# Patient Record
Sex: MALE | Race: WHITE | NOT HISPANIC OR LATINO | Employment: OTHER | URBAN - METROPOLITAN AREA
[De-identification: names, ages, dates, MRNs, and addresses within clinical notes are randomized per-mention and may not be internally consistent; named-entity substitution may affect disease eponyms.]

---

## 2017-01-06 ENCOUNTER — OFFICE VISIT (OUTPATIENT)
Dept: FAMILY MEDICINE CLINIC | Facility: CLINIC | Age: 67
End: 2017-01-06

## 2017-01-06 VITALS
HEART RATE: 80 BPM | SYSTOLIC BLOOD PRESSURE: 122 MMHG | DIASTOLIC BLOOD PRESSURE: 76 MMHG | BODY MASS INDEX: 34.96 KG/M2 | OXYGEN SATURATION: 95 % | HEIGHT: 69 IN | WEIGHT: 236 LBS

## 2017-01-06 DIAGNOSIS — G89.29 CHRONIC BILATERAL LOW BACK PAIN WITHOUT SCIATICA: ICD-10-CM

## 2017-01-06 DIAGNOSIS — M54.50 CHRONIC BILATERAL LOW BACK PAIN WITHOUT SCIATICA: ICD-10-CM

## 2017-01-06 DIAGNOSIS — E78.2 MIXED HYPERLIPIDEMIA: Primary | ICD-10-CM

## 2017-01-06 PROCEDURE — 99213 OFFICE O/P EST LOW 20 MIN: CPT | Performed by: PHYSICIAN ASSISTANT

## 2017-01-06 RX ORDER — PRAVASTATIN SODIUM 40 MG
40 TABLET ORAL DAILY
Qty: 90 TABLET | Refills: 3 | Status: SHIPPED | OUTPATIENT
Start: 2017-01-06 | End: 2017-03-31 | Stop reason: SDUPTHER

## 2017-01-06 NOTE — PROGRESS NOTES
Subjective   Flavio Slater is a 66 y.o. male    History of Present Illness  Patient presents a for follow-up on hyperlipidemia and chronic low back pain associated to degenerative disc disease and spinal stenosis.  He states that his low back pain is constant and daily ranging from 3-7 depending on activity.  It improves with activity and worsens with prolonged rest.  Pain medication helps significantly.  He is able to function.  Daily activities with the assistance of his Norco which he takes typically 3 times a day.  He states some days he will needed 4 times a day and then other days twice a day, a quantity of 90 last him 30 days.  He states his back pain is stable, no recent changes.  Patient has been off of pravastatin for the past 2 months after he ran out of medication.  He states when he was initially diagnosed with diabetes he develops myalgias from it but then the myalgias went away.  The last time he filled his pravastatin he was tolerating it without any adverse effects.  His lipids were last checked in March.  He is not fasting today.  The following portions of the patient's history were reviewed and updated as appropriate: allergies, current medications, past social history and problem list    Review of Systems   Constitutional: Negative.  Negative for fatigue and unexpected weight change.   Respiratory: Negative.  Negative for cough, chest tightness and shortness of breath.    Cardiovascular: Negative for chest pain, palpitations and leg swelling.   Gastrointestinal: Negative.  Negative for nausea.   Musculoskeletal: Positive for back pain. Negative for arthralgias, gait problem and myalgias.   Skin: Negative for color change and rash.   Neurological: Negative for dizziness, tremors, syncope, weakness, numbness and headaches.   Psychiatric/Behavioral: Negative for behavioral problems and dysphoric mood. The patient is not nervous/anxious.        Objective     Vitals:    01/06/17 0918   BP: 122/76    Pulse: 80   SpO2: 95%       Physical Exam   Constitutional: He is oriented to person, place, and time. He appears well-developed and well-nourished.   Neck: No JVD present.   Cardiovascular: Normal rate, regular rhythm, normal heart sounds, intact distal pulses and normal pulses.    No murmur heard.  Pulmonary/Chest: Effort normal and breath sounds normal. No respiratory distress.   Abdominal: Soft. Bowel sounds are normal. There is no hepatosplenomegaly. There is no tenderness.   Musculoskeletal: He exhibits no edema.        Lumbar back: He exhibits decreased range of motion, tenderness, bony tenderness and pain. He exhibits no swelling, no deformity and no spasm.   Neurological: He is alert and oriented to person, place, and time. He has normal reflexes.   Skin: Skin is warm and dry.   Nursing note and vitals reviewed.      Assessment/Plan     Diagnoses and all orders for this visit:    Mixed hyperlipidemia  -     pravastatin (PRAVACHOL) 40 MG tablet; Take 1 tablet by mouth Daily.    Chronic bilateral low back pain without sciatica     recommended patient having his lipids checked within the next 6 months when he is fasting.  Refilled Norco 10/325 one 3 times a day as needed for back pain dispensed #90 with no refills, follow-up in one month for recheck.

## 2017-01-06 NOTE — MR AVS SNAPSHOT
Flavio Slater   1/6/2017 8:45 AM   Office Visit    Dept Phone:  [phone]   Encounter #:  [id number]    Provider:  Destinee Correia PA-C   Department:  [facility]                Your Full Care Plan              Where to Get Your Medications      These medications were sent to [facility] - [place], [place] - [address] [place] [address] - [phone]  - [phone]   [address] [place] [address]     Phone:  [phone]     pravastatin 40 MG tablet            Your Updated Medication List          This list is accurate as of: 1/6/17  9:41 AM.  Always use your most recent med list.                aspirin 81 MG EC tablet       cephalexin 500 MG capsule   Commonly known as:  KEFLEX   Take 1 capsule by mouth 3 (Three) Times a Day.       diltiaZEM  MG 24 hr capsule   Commonly known as:  CARDIZEM CD       diltiazem  MG 24 hr capsule   Commonly known as:  DILACOR XR       freestyle lancets   Test blood glucose twice daily.       * FREESTYLE LITE TEST VI       * FREESTYLE LITE test strip   Generic drug:  glucose blood   USE TWICE A DAY       gabapentin 300 MG capsule   Commonly known as:  NEURONTIN       HYDROcodone-acetaminophen  MG per tablet   Commonly known as:  NORCO   Take 1 tablet by mouth 3 (Three) Times a Day As Needed for moderate pain (4-6).       lisinopril-hydrochlorothiazide 20-25 MG per tablet   Commonly known as:  PRINZIDE,ZESTORETIC       LORazepam 1 MG tablet   Commonly known as:  ATIVAN   Take 1 tablet by mouth 2 (Two) Times a Day.       metFORMIN  MG 24 hr tablet   Commonly known as:  GLUCOPHAGE-XR   2 tabs po BID       PARoxetine 40 MG tablet   Commonly known as:  PAXIL   Take 1 tablet by mouth every morning.       pravastatin 40 MG tablet   Commonly known as:  PRAVACHOL   Take 1 tablet by mouth Daily.       spironolactone 25 MG tablet   Commonly known as:  ALDACTONE       * Notice:  This  "list has 2 medication(s) that are the same as other medications prescribed for you. Read the directions carefully, and ask your doctor or other care provider to review them with you.            You Were Diagnosed With        Codes Comments    Mixed hyperlipidemia    -  Primary ICD-10-CM: E78.2  ICD-9-CM: 272.2       Instructions     None    Patient Instructions History      Upcoming Appointments     Visit Type Date Time Department    OFFICE VISIT 2017  8:45 AM [facility] Signup     [facility] allows you to send messages to your doctor, view your test results, renew your prescriptions, schedule appointments, and more. To sign up, go to Meez and click on the Sign Up Now link in the New User? box. Enter your [employer] Activation Code exactly as it appears below along with the last four digits of your Social Security Number and your Date of Birth () to complete the sign-up process. If you do not sign up before the expiration date, you must request a new code.    [employer] Activation Code: [email or web address]  Expires: 2017  5:37 AM    If you have questions, you can email [email or web address] or call [phone] to talk to our [employer] staff. Remember, [employer] is NOT to be used for urgent needs. For medical emergencies, dial 911.               Other Info from Your Visit           Allergies     Phenobarbital Sodium      Hypnotics/sedatives/sleep disorder agents      Reason for Visit     Back Pain     Hyperlipidemia           Vital Signs     Blood Pressure Pulse Height Weight Oxygen Saturation Body Mass Index    122/76 (BP Location: Right arm, Patient Position: Sitting) 80 69\" (175.3 cm) 236 lb (107 kg) 95% 34.85 kg/m2    Smoking Status                   Former Smoker           Problems and Diagnoses Noted     High cholesterol or triglycerides        "

## 2017-01-27 ENCOUNTER — OFFICE VISIT (OUTPATIENT)
Dept: FAMILY MEDICINE CLINIC | Facility: CLINIC | Age: 67
End: 2017-01-27

## 2017-01-27 VITALS
RESPIRATION RATE: 20 BRPM | TEMPERATURE: 98.4 F | SYSTOLIC BLOOD PRESSURE: 120 MMHG | HEART RATE: 100 BPM | HEIGHT: 69 IN | WEIGHT: 232 LBS | OXYGEN SATURATION: 98 % | DIASTOLIC BLOOD PRESSURE: 74 MMHG | BODY MASS INDEX: 34.36 KG/M2

## 2017-01-27 DIAGNOSIS — J02.9 SORE THROAT: ICD-10-CM

## 2017-01-27 DIAGNOSIS — J06.9 URI, ACUTE: ICD-10-CM

## 2017-01-27 DIAGNOSIS — R50.81 FEVER IN OTHER DISEASES: Primary | ICD-10-CM

## 2017-01-27 LAB
EXPIRATION DATE: NORMAL
EXPIRATION DATE: NORMAL
FLUAV AG NPH QL: NEGATIVE
FLUBV AG NPH QL: NEGATIVE
INTERNAL CONTROL: NORMAL
INTERNAL CONTROL: NORMAL
Lab: NORMAL
Lab: NORMAL
S PYO AG THROAT QL: NEGATIVE

## 2017-01-27 PROCEDURE — 87880 STREP A ASSAY W/OPTIC: CPT | Performed by: PHYSICIAN ASSISTANT

## 2017-01-27 PROCEDURE — 87804 INFLUENZA ASSAY W/OPTIC: CPT | Performed by: PHYSICIAN ASSISTANT

## 2017-01-27 PROCEDURE — 99213 OFFICE O/P EST LOW 20 MIN: CPT | Performed by: PHYSICIAN ASSISTANT

## 2017-01-27 RX ORDER — AMOXICILLIN 875 MG/1
875 TABLET, COATED ORAL 2 TIMES DAILY
Qty: 14 TABLET | Refills: 0 | Status: SHIPPED | OUTPATIENT
Start: 2017-01-27 | End: 2017-01-27 | Stop reason: SDUPTHER

## 2017-01-27 RX ORDER — DEXTROMETHORPHAN HYDROBROMIDE AND PROMETHAZINE HYDROCHLORIDE 15; 6.25 MG/5ML; MG/5ML
SYRUP ORAL
Qty: 240 ML | Refills: 0 | Status: SHIPPED | OUTPATIENT
Start: 2017-01-27 | End: 2017-01-27 | Stop reason: SDUPTHER

## 2017-01-27 RX ORDER — AMOXICILLIN 875 MG/1
875 TABLET, COATED ORAL 2 TIMES DAILY
Qty: 14 TABLET | Refills: 0 | Status: SHIPPED | OUTPATIENT
Start: 2017-01-27 | End: 2017-02-06

## 2017-01-27 RX ORDER — DEXTROMETHORPHAN HYDROBROMIDE AND PROMETHAZINE HYDROCHLORIDE 15; 6.25 MG/5ML; MG/5ML
SYRUP ORAL
Qty: 240 ML | Refills: 0 | Status: SHIPPED | OUTPATIENT
Start: 2017-01-27 | End: 2017-02-06

## 2017-01-27 NOTE — MR AVS SNAPSHOT
Flavio Slater   1/27/2017 9:30 AM   Office Visit    Dept Phone:  [phone]   Encounter #:  [id number]    Provider:  Destinee Correia PA-C   Department:  [facility]                Your Full Care Plan              Today's Medication Changes          These changes are accurate as of: 1/27/17 10:13 AM.  If you have any questions, ask your nurse or doctor.               New Medication(s)Ordered:     amoxicillin 875 MG tablet   Commonly known as:  AMOXIL   Take 1 tablet by mouth 2 (Two) Times a Day.   Started by:  Destinee Correia PA-C       promethazine-dextromethorphan 6.25-15 MG/5ML syrup   Commonly known as:  PROMETHAZINE-DM   Take 1-2 tsp every 4 hours as needed   Started by:  Destinee Correia PA-C         Stop taking medication(s)listed here:     cephalexin 500 MG capsule   Commonly known as:  KEFLEX   Stopped by:  Dsetinee Correia PA-C           diltiaZEM  MG 24 hr capsule   Commonly known as:  CARDIZEM CD   Stopped by:  Destinee Correia PA-C                Where to Get Your Medications      These medications were sent to [place] [phone] [phone] [address] [address]     Phone:  [phone]     amoxicillin 875 MG tablet    promethazine-dextromethorphan 6.25-15 MG/5ML syrup                  Your Updated Medication List          This list is accurate as of: 1/27/17 10:13 AM.  Always use your most recent med list.                amoxicillin 875 MG tablet   Commonly known as:  AMOXIL   Take 1 tablet by mouth 2 (Two) Times a Day.       aspirin 81 MG EC tablet       diltiazem  MG 24 hr capsule   Commonly known as:  DILACOR XR       freestyle lancets   Test blood glucose twice daily.       * FREESTYLE LITE TEST VI       * FREESTYLE LITE test strip   Generic drug:  glucose blood   USE TWICE A DAY       gabapentin 300 MG capsule   Commonly known as:  NEURONTIN        HYDROcodone-acetaminophen  MG per tablet   Commonly known as:  NORCO   Take 1 tablet by mouth 3 (Three) Times a Day As Needed for moderate pain (4-6).       lisinopril-hydrochlorothiazide 20-25 MG per tablet   Commonly known as:  PRINZIDE,ZESTORETIC       LORazepam 1 MG tablet   Commonly known as:  ATIVAN   Take 1 tablet by mouth 2 (Two) Times a Day.       metFORMIN  MG 24 hr tablet   Commonly known as:  GLUCOPHAGE-XR   2 tabs po BID       PARoxetine 40 MG tablet   Commonly known as:  PAXIL   Take 1 tablet by mouth every morning.       pravastatin 40 MG tablet   Commonly known as:  PRAVACHOL   Take 1 tablet by mouth Daily.       promethazine-dextromethorphan 6.25-15 MG/5ML syrup   Commonly known as:  PROMETHAZINE-DM   Take 1-2 tsp every 4 hours as needed       spironolactone 25 MG tablet   Commonly known as:  ALDACTONE       * Notice:  This list has 2 medication(s) that are the same as other medications prescribed for you. Read the directions carefully, and ask your doctor or other care provider to review them with you.            We Performed the Following     POC Influenza A / B     POC Rapid Strep A       You Were Diagnosed With        Codes Comments    Fever in other diseases    -  Primary ICD-10-CM: R50.81     Sore throat     ICD-10-CM: J02.9  ICD-9-CM: 462     URI, acute     ICD-10-CM: J06.9  ICD-9-CM: 465.9       Instructions     None    Patient Instructions History      Upcoming Appointments     Visit Type Date Time Department    OFFICE VISIT 1/27/2017  9:30 AM [facility]    FOLLOW UP 2/6/2017 11:45 AM [facility]      [employer] Signup     Our records indicate that you have an active [employer] account.    You can view your After Visit Summary by going to [employer] and logging in with your [employer] username and password.  If you don't have a [employer] username and password but a parent or guardian has access to your record, the parent or guardian should  "login with their own [employer] username and password and access your record to view the After Visit Summary.    If you have questions, you can email [facility], [email or web address] or call [phone] to talk to our [employer] staff.  Remember, [employer] is NOT to be used for urgent needs.  For medical emergencies, dial 911.               Other Info from Your Visit           Your Appointments     Feb 06, 2017 11:45 AM EST   Follow Up with Destinee Correia PA-C   [facility]INE (--)    [address] [place] [address] [address] [place] [address] [id number] [phone]           Arrive 15 minutes prior to appointment.              Allergies     Phenobarbital Sodium      Hypnotics/sedatives/sleep disorder agents      Reason for Visit     Fever States symptoms started two days     Nasal Congestion     Cough           Vital Signs     Blood Pressure Pulse Temperature Respirations Height Weight    120/74 (BP Location: Left arm, Patient Position: Sitting) 100 98.4 °F (36.9 °C) (Oral) 20 69\" (175.3 cm) 232 lb (105 kg)    Oxygen Saturation Body Mass Index Smoking Status             98% 34.26 kg/m2 Former Smoker         Problems and Diagnoses Noted     Fever    -  Primary    Sore throat        URI, acute          Results     POC Rapid Strep A      Component Value Standard Range & Units    Rapid Strep A Screen Negative Negative, VALID, INVALID, Not Performed    Internal Control Passed Passed    Lot Number [id number]     Expiration Date 2018/4                     "

## 2017-01-27 NOTE — PROGRESS NOTES
Subjective   Flavio Slater is a 66 y.o. male    History of Present Illness  Patient resents today complaining of symptoms that started 2-3 days ago with a cough which is productive at times, sore throat, postnasal drainage, nasal congestion.  Low-grade fever at times.  The following portions of the patient's history were reviewed and updated as appropriate: allergies, current medications, past social history and problem list    Review of Systems   Constitutional: Positive for chills. Negative for fever.   HENT: Positive for congestion, postnasal drip, rhinorrhea, sneezing, sore throat and voice change. Negative for sinus pressure.    Respiratory: Positive for cough.    Neurological: Positive for headaches.       Objective     Vitals:    01/27/17 0946   BP: 120/74   Pulse: 100   Resp: 20   Temp: 98.4 °F (36.9 °C)   SpO2: 98%       Physical Exam   Constitutional: He appears well-developed and well-nourished. No distress.   HENT:   Head: Normocephalic and atraumatic.   Right Ear: External ear normal.   Left Ear: External ear normal.   Nose: Nose normal.   Mouth/Throat: Oropharynx is clear and moist. No oropharyngeal exudate.   Eyes: Conjunctivae are normal. Right eye exhibits no discharge. Left eye exhibits no discharge.   Neck: Normal range of motion. Neck supple.   Cardiovascular: Normal rate, regular rhythm and normal heart sounds.    Pulmonary/Chest: Effort normal and breath sounds normal. No respiratory distress.   Lymphadenopathy:     He has no cervical adenopathy.   Skin: Skin is warm and dry. He is not diaphoretic.   Nursing note and vitals reviewed.      Assessment/Plan     Diagnoses and all orders for this visit:    Fever in other diseases  -     POC Rapid Strep A  -     POC Influenza A / B  -     Discontinue: amoxicillin (AMOXIL) 875 MG tablet; Take 1 tablet by mouth 2 (Two) Times a Day.  -     Discontinue: promethazine-dextromethorphan (PROMETHAZINE-DM) 6.25-15 MG/5ML syrup; Take 1-2 tsp every 4 hours as  needed  -     promethazine-dextromethorphan (PROMETHAZINE-DM) 6.25-15 MG/5ML syrup; Take 1-2 tsp every 4 hours as needed  -     amoxicillin (AMOXIL) 875 MG tablet; Take 1 tablet by mouth 2 (Two) Times a Day.    Sore throat  -     POC Rapid Strep A  -     POC Influenza A / B  -     Discontinue: amoxicillin (AMOXIL) 875 MG tablet; Take 1 tablet by mouth 2 (Two) Times a Day.  -     Discontinue: promethazine-dextromethorphan (PROMETHAZINE-DM) 6.25-15 MG/5ML syrup; Take 1-2 tsp every 4 hours as needed  -     promethazine-dextromethorphan (PROMETHAZINE-DM) 6.25-15 MG/5ML syrup; Take 1-2 tsp every 4 hours as needed  -     amoxicillin (AMOXIL) 875 MG tablet; Take 1 tablet by mouth 2 (Two) Times a Day.    URI, acute  -     Discontinue: amoxicillin (AMOXIL) 875 MG tablet; Take 1 tablet by mouth 2 (Two) Times a Day.  -     Discontinue: promethazine-dextromethorphan (PROMETHAZINE-DM) 6.25-15 MG/5ML syrup; Take 1-2 tsp every 4 hours as needed  -     promethazine-dextromethorphan (PROMETHAZINE-DM) 6.25-15 MG/5ML syrup; Take 1-2 tsp every 4 hours as needed  -     amoxicillin (AMOXIL) 875 MG tablet; Take 1 tablet by mouth 2 (Two) Times a Day.

## 2017-02-06 ENCOUNTER — OFFICE VISIT (OUTPATIENT)
Dept: FAMILY MEDICINE CLINIC | Facility: CLINIC | Age: 67
End: 2017-02-06

## 2017-02-06 VITALS
HEIGHT: 69 IN | BODY MASS INDEX: 34.36 KG/M2 | DIASTOLIC BLOOD PRESSURE: 68 MMHG | WEIGHT: 232 LBS | HEART RATE: 84 BPM | SYSTOLIC BLOOD PRESSURE: 134 MMHG | OXYGEN SATURATION: 97 %

## 2017-02-06 DIAGNOSIS — G89.29 CHRONIC BILATERAL LOW BACK PAIN WITHOUT SCIATICA: ICD-10-CM

## 2017-02-06 DIAGNOSIS — I10 ESSENTIAL HYPERTENSION: Primary | ICD-10-CM

## 2017-02-06 DIAGNOSIS — M54.50 CHRONIC BILATERAL LOW BACK PAIN WITHOUT SCIATICA: ICD-10-CM

## 2017-02-06 PROCEDURE — 99213 OFFICE O/P EST LOW 20 MIN: CPT | Performed by: PHYSICIAN ASSISTANT

## 2017-02-06 RX ORDER — LISINOPRIL 40 MG/1
40 TABLET ORAL DAILY
Qty: 30 TABLET | Refills: 1 | Status: SHIPPED | OUTPATIENT
Start: 2017-02-06 | End: 2017-03-06 | Stop reason: SDUPTHER

## 2017-02-06 NOTE — PROGRESS NOTES
Subjective   Flavio Slater is a 66 y.o. male    History of Present Illness  Patient presents today for follow-up on chronic low back pain associated with degenerative disc disease and spinal stenosis.  He is here for refills of his Norco 10/325 that he takes for this on a daily basis.  He is also here for evaluation of hypertension and wants to discuss a possible med change for his lisinopril. He is having  a dry mouth lately and he believes that this is related to his Hydrocort thiazide.  He would like to change his blood pressure medicine to remove this from it.  Blood pressure has been well controlled.  Renal function was normal when last checked within the past year.  Patient states his back pain today as a 5-6 out of 10.  He has not yet taken his pain medication.  He takes this between one to 3 times daily depending upon his pain.  He states he took it 3 times a day through the weekend.  He denies any adverse effects from Norco.  The following portions of the patient's history were reviewed and updated as appropriate: allergies, current medications, past social history and problem list    Review of Systems   Constitutional: Negative.  Negative for fatigue and unexpected weight change.   Respiratory: Negative.  Negative for cough, chest tightness and shortness of breath.    Cardiovascular: Negative for chest pain, palpitations and leg swelling.   Gastrointestinal: Negative.  Negative for nausea.   Musculoskeletal: Positive for back pain. Negative for arthralgias, gait problem and myalgias.   Skin: Negative for color change and rash.   Neurological: Negative for dizziness, tremors, syncope, weakness, numbness and headaches.   Psychiatric/Behavioral: Negative for behavioral problems and dysphoric mood. The patient is not nervous/anxious.        Objective     Vitals:    02/06/17 1147   BP: 134/68   Pulse: 84   SpO2: 97%       Physical Exam   Constitutional: He is oriented to person, place, and time. He appears  well-developed and well-nourished.   Neck: No JVD present.   Cardiovascular: Normal rate, regular rhythm, normal heart sounds, intact distal pulses and normal pulses.    No murmur heard.  Pulmonary/Chest: Effort normal and breath sounds normal. No respiratory distress.   Abdominal: Soft. Bowel sounds are normal. There is no hepatosplenomegaly. There is no tenderness.   Musculoskeletal: He exhibits no edema.        Lumbar back: He exhibits decreased range of motion, tenderness, bony tenderness and pain. He exhibits no swelling, no deformity and no spasm.   Neurological: He is alert and oriented to person, place, and time. He has normal reflexes.   Skin: Skin is warm and dry.   Nursing note and vitals reviewed.      Assessment/Plan     Diagnoses and all orders for this visit:    Essential hypertension  -     lisinopril (PRINIVIL,ZESTRIL) 40 MG tablet; Take 1 tablet by mouth Daily.    Chronic bilateral low back pain without sciatica     discontinue Zestoretic, increase lisinopril to 40 mg daily, recheck blood pressure in 1 month.  Refilled Norco 10/325 to take 1 3 times a day as needed #90 with no refills, follow-up in one month for recheck.  Attempted to get UDS specimen today the patient had just urinated and was unable to give us a specimen.  We will obtain this at his visit next month prior to administering his medication.

## 2017-02-08 ENCOUNTER — TELEPHONE (OUTPATIENT)
Dept: FAMILY MEDICINE CLINIC | Facility: CLINIC | Age: 67
End: 2017-02-08

## 2017-02-16 RX ORDER — DILTIAZEM HYDROCHLORIDE 240 MG/1
240 CAPSULE, EXTENDED RELEASE ORAL DAILY
Qty: 90 CAPSULE | Refills: 1 | Status: SHIPPED | OUTPATIENT
Start: 2017-02-16 | End: 2017-03-31 | Stop reason: SDUPTHER

## 2017-03-06 ENCOUNTER — OFFICE VISIT (OUTPATIENT)
Dept: FAMILY MEDICINE CLINIC | Facility: CLINIC | Age: 67
End: 2017-03-06

## 2017-03-06 ENCOUNTER — APPOINTMENT (OUTPATIENT)
Dept: LAB | Facility: HOSPITAL | Age: 67
End: 2017-03-06

## 2017-03-06 VITALS
DIASTOLIC BLOOD PRESSURE: 80 MMHG | TEMPERATURE: 98.2 F | HEART RATE: 88 BPM | HEIGHT: 69 IN | WEIGHT: 232 LBS | BODY MASS INDEX: 34.36 KG/M2 | SYSTOLIC BLOOD PRESSURE: 140 MMHG

## 2017-03-06 DIAGNOSIS — E11.9 TYPE 2 DIABETES MELLITUS WITHOUT COMPLICATION, UNSPECIFIED LONG TERM INSULIN USE STATUS: ICD-10-CM

## 2017-03-06 DIAGNOSIS — M54.50 CHRONIC BILATERAL LOW BACK PAIN WITHOUT SCIATICA: Primary | ICD-10-CM

## 2017-03-06 DIAGNOSIS — E78.2 MIXED HYPERLIPIDEMIA: ICD-10-CM

## 2017-03-06 DIAGNOSIS — I10 ESSENTIAL HYPERTENSION: ICD-10-CM

## 2017-03-06 DIAGNOSIS — G89.29 CHRONIC BILATERAL LOW BACK PAIN WITHOUT SCIATICA: Primary | ICD-10-CM

## 2017-03-06 LAB
ALBUMIN SERPL-MCNC: 4.3 G/DL (ref 3.2–4.8)
ALBUMIN/GLOB SERPL: 1.5 G/DL (ref 1.5–2.5)
ALP SERPL-CCNC: 63 U/L (ref 25–100)
ALT SERPL W P-5'-P-CCNC: 31 U/L (ref 7–40)
ANION GAP SERPL CALCULATED.3IONS-SCNC: 6 MMOL/L (ref 3–11)
ARTICHOKE IGE QN: 101 MG/DL (ref 0–130)
AST SERPL-CCNC: 26 U/L (ref 0–33)
BILIRUB SERPL-MCNC: 0.5 MG/DL (ref 0.3–1.2)
BUN BLD-MCNC: 14 MG/DL (ref 9–23)
BUN/CREAT SERPL: 20 (ref 7–25)
CALCIUM SPEC-SCNC: 9.2 MG/DL (ref 8.7–10.4)
CHLORIDE SERPL-SCNC: 103 MMOL/L (ref 99–109)
CHOLEST SERPL-MCNC: 212 MG/DL (ref 0–200)
CO2 SERPL-SCNC: 28 MMOL/L (ref 20–31)
CREAT BLD-MCNC: 0.7 MG/DL (ref 0.6–1.3)
GFR SERPL CREATININE-BSD FRML MDRD: 114 ML/MIN/1.73
GLOBULIN UR ELPH-MCNC: 2.9 GM/DL
GLUCOSE BLD-MCNC: 148 MG/DL (ref 70–100)
HBA1C MFR BLD: 7.8 % (ref 4.8–5.6)
HDLC SERPL-MCNC: 39 MG/DL (ref 40–60)
POTASSIUM BLD-SCNC: 4.3 MMOL/L (ref 3.5–5.5)
PROT SERPL-MCNC: 7.2 G/DL (ref 5.7–8.2)
SODIUM BLD-SCNC: 137 MMOL/L (ref 132–146)
TRIGL SERPL-MCNC: 439 MG/DL (ref 0–150)

## 2017-03-06 PROCEDURE — 83036 HEMOGLOBIN GLYCOSYLATED A1C: CPT | Performed by: PHYSICIAN ASSISTANT

## 2017-03-06 PROCEDURE — 80061 LIPID PANEL: CPT | Performed by: PHYSICIAN ASSISTANT

## 2017-03-06 PROCEDURE — 36415 COLL VENOUS BLD VENIPUNCTURE: CPT | Performed by: PHYSICIAN ASSISTANT

## 2017-03-06 PROCEDURE — 80053 COMPREHEN METABOLIC PANEL: CPT | Performed by: PHYSICIAN ASSISTANT

## 2017-03-06 PROCEDURE — 99213 OFFICE O/P EST LOW 20 MIN: CPT | Performed by: PHYSICIAN ASSISTANT

## 2017-03-06 RX ORDER — LISINOPRIL 40 MG/1
40 TABLET ORAL DAILY
Qty: 90 TABLET | Refills: 1 | Status: SHIPPED | OUTPATIENT
Start: 2017-03-06 | End: 2017-03-15 | Stop reason: SDUPTHER

## 2017-03-06 NOTE — PROGRESS NOTES
Subjective   Flavio Slater is a 66 y.o. male    History of Present Illness    Patient presents today for follow-up on chronic low back pain associated with degenerative disc disease.  In the past he has taken Percocet 10/325 mg tablets between once a day up to 3 times a day.  When asked today he states that he has been taking his medication 4 times a day because his pain has been worse lately.  He states his pain continues to be well controlled on Percocet that sometimes he feels that the pain medication wears off more quickly than others.  His pain today is stable at a 6 out of 10.  Additionally patient is here for follow-up on hypertension, blood pressures well-controlled at this time, stable on plain lisinopril, see previous office notes were medication was changed.  Patient is due for labs monitoring hypertension and diabetes today also.  The following portions of the patient's history were reviewed and updated as appropriate: allergies, current medications, past social history and problem list    Review of Systems   Constitutional: Negative.  Negative for fatigue and unexpected weight change.   Respiratory: Negative.  Negative for cough, chest tightness and shortness of breath.    Cardiovascular: Negative for chest pain, palpitations and leg swelling.   Gastrointestinal: Negative.  Negative for nausea.   Musculoskeletal: Positive for back pain. Negative for arthralgias, gait problem and myalgias.   Skin: Negative for color change and rash.   Neurological: Negative for dizziness, tremors, syncope, weakness, numbness and headaches.   Psychiatric/Behavioral: Negative for behavioral problems and dysphoric mood. The patient is not nervous/anxious.        Objective     Vitals:    03/06/17 1201   BP: 140/80   Pulse: 88   Temp: 98.2 °F (36.8 °C)       Physical Exam   Constitutional: He is oriented to person, place, and time. He appears well-developed and well-nourished.   Cardiovascular: Normal rate and regular  rhythm.    Pulmonary/Chest: Effort normal and breath sounds normal.   Abdominal: Soft. Bowel sounds are normal.   Musculoskeletal:        Lumbar back: He exhibits decreased range of motion, tenderness, bony tenderness and pain. He exhibits no swelling, no deformity and no spasm.   Gait is normal.   Neurological: He is alert and oriented to person, place, and time. He has normal reflexes.   Nursing note and vitals reviewed.      Assessment/Plan     Diagnoses and all orders for this visit:    Chronic bilateral low back pain without sciatica  -     Comprehensive Metabolic Panel  -     Hemoglobin A1c  -     Lipid Panel    Essential hypertension  -     lisinopril (PRINIVIL,ZESTRIL) 40 MG tablet; Take 1 tablet by mouth Daily.  -     Comprehensive Metabolic Panel  -     Hemoglobin A1c  -     Lipid Panel    Type 2 diabetes mellitus without complication, unspecified long term insulin use status  -     Comprehensive Metabolic Panel  -     Hemoglobin A1c  -     Lipid Panel    Mixed hyperlipidemia  -     Comprehensive Metabolic Panel  -     Hemoglobin A1c  -     Lipid Panel     refill given of Percocet 10/325 mg tablets.  Dispensed #90.  I instructed patient he is to take no more than 3 per day.  I offered him a referral to pain management, patient declines.  He states that he will stick with the instructed dose of 3 times daily.  He will follow-up in one month for recheck.

## 2017-03-08 ENCOUNTER — TELEPHONE (OUTPATIENT)
Dept: FAMILY MEDICINE CLINIC | Facility: CLINIC | Age: 67
End: 2017-03-08

## 2017-03-08 RX ORDER — GABAPENTIN 300 MG/1
300 CAPSULE ORAL EVERY 12 HOURS
Qty: 180 CAPSULE | Refills: 1 | Status: SHIPPED | OUTPATIENT
Start: 2017-03-08 | End: 2017-03-31 | Stop reason: SDUPTHER

## 2017-03-08 NOTE — TELEPHONE ENCOUNTER
I need for this to be changed to a telephone encounter so that is documented into the chart correctly before sent back to me please

## 2017-03-08 NOTE — TELEPHONE ENCOUNTER
Per pharmacy, patient request refill on gabapentin 300 mg 1 every 12 hours.  Patient's last ov was 03/06

## 2017-03-08 NOTE — TELEPHONE ENCOUNTER
Per patient's pharmacy, he needs a refill on his gabapentin 300 mg 1 cap q 12 hrs.  Patient's last ov was 3/6. Patient is allergic to phenobarbital. [facility].

## 2017-03-15 DIAGNOSIS — I10 ESSENTIAL HYPERTENSION: ICD-10-CM

## 2017-03-15 RX ORDER — LISINOPRIL 40 MG/1
40 TABLET ORAL DAILY
Qty: 90 TABLET | Refills: 1 | Status: SHIPPED | OUTPATIENT
Start: 2017-03-15 | End: 2017-03-15 | Stop reason: SDUPTHER

## 2017-03-15 RX ORDER — LISINOPRIL 40 MG/1
40 TABLET ORAL DAILY
Qty: 90 TABLET | Refills: 1 | Status: SHIPPED | OUTPATIENT
Start: 2017-03-15 | End: 2017-03-31 | Stop reason: SDUPTHER

## 2017-03-21 ENCOUNTER — TELEPHONE (OUTPATIENT)
Dept: FAMILY MEDICINE CLINIC | Facility: CLINIC | Age: 67
End: 2017-03-21

## 2017-03-21 ENCOUNTER — APPOINTMENT (OUTPATIENT)
Dept: LAB | Facility: HOSPITAL | Age: 67
End: 2017-03-21

## 2017-03-21 DIAGNOSIS — Z02.83 BLOOD DRUG TESTING FOR MEDICOLEGAL REASONS: Primary | ICD-10-CM

## 2017-03-21 LAB
AMPHET+METHAMPHET UR QL: NEGATIVE
AMPHETAMINES UR QL: NEGATIVE
BARBITURATES UR QL SCN: NEGATIVE
BENZODIAZ UR QL SCN: NEGATIVE
BUPRENORPHINE SERPL-MCNC: NEGATIVE NG/ML
CANNABINOIDS SERPL QL: NEGATIVE
COCAINE UR QL: NEGATIVE
METHADONE UR QL SCN: NEGATIVE
OPIATES UR QL: NEGATIVE
OXYCODONE UR QL SCN: NEGATIVE
PCP UR QL SCN: NEGATIVE
PROPOXYPH UR QL: NEGATIVE
TRICYCLICS UR QL SCN: NEGATIVE

## 2017-03-21 PROCEDURE — 80306 DRUG TEST PRSMV INSTRMNT: CPT | Performed by: PHYSICIAN ASSISTANT

## 2017-03-21 NOTE — TELEPHONE ENCOUNTER
Per Destinee Correia, Patient notified that he has to come in today to get his labs done. Patient states he is on his way.

## 2017-03-21 NOTE — TELEPHONE ENCOUNTER
Viviana, please call  Flavio Slater today and instruct him that he needs to go to a [facility] laboratory today to leave a urine specimen for urine drug screen.  He is due for this and needs to go today.

## 2017-03-31 ENCOUNTER — APPOINTMENT (OUTPATIENT)
Dept: LAB | Facility: HOSPITAL | Age: 67
End: 2017-03-31

## 2017-03-31 ENCOUNTER — OFFICE VISIT (OUTPATIENT)
Dept: FAMILY MEDICINE CLINIC | Facility: CLINIC | Age: 67
End: 2017-03-31

## 2017-03-31 VITALS
DIASTOLIC BLOOD PRESSURE: 80 MMHG | SYSTOLIC BLOOD PRESSURE: 136 MMHG | HEART RATE: 81 BPM | HEIGHT: 69 IN | TEMPERATURE: 98.1 F | BODY MASS INDEX: 34.66 KG/M2 | WEIGHT: 234 LBS | OXYGEN SATURATION: 98 %

## 2017-03-31 DIAGNOSIS — E11.9 TYPE 2 DIABETES MELLITUS WITHOUT COMPLICATION, UNSPECIFIED LONG TERM INSULIN USE STATUS: ICD-10-CM

## 2017-03-31 DIAGNOSIS — Z00.00 GENERAL MEDICAL EXAM: Primary | ICD-10-CM

## 2017-03-31 DIAGNOSIS — R35.0 FREQUENT URINATION: ICD-10-CM

## 2017-03-31 DIAGNOSIS — N40.0 BENIGN NON-NODULAR PROSTATIC HYPERPLASIA, PRESENCE OF LOWER URINARY TRACT SYMPTOMS UNSPECIFIED: ICD-10-CM

## 2017-03-31 DIAGNOSIS — Z11.59 NEED FOR HEPATITIS C SCREENING TEST: ICD-10-CM

## 2017-03-31 DIAGNOSIS — I10 ESSENTIAL HYPERTENSION: ICD-10-CM

## 2017-03-31 DIAGNOSIS — R35.1 NOCTURIA ASSOCIATED WITH BENIGN PROSTATIC HYPERTROPHY: ICD-10-CM

## 2017-03-31 DIAGNOSIS — N40.1 NOCTURIA ASSOCIATED WITH BENIGN PROSTATIC HYPERTROPHY: ICD-10-CM

## 2017-03-31 DIAGNOSIS — E78.2 MIXED HYPERLIPIDEMIA: ICD-10-CM

## 2017-03-31 LAB
BILIRUB BLD-MCNC: NEGATIVE MG/DL
CLARITY, POC: CLEAR
COLOR UR: YELLOW
GLUCOSE UR STRIP-MCNC: ABNORMAL MG/DL
HCV AB SER DONR QL: NORMAL
KETONES UR QL: NEGATIVE
LEUKOCYTE EST, POC: NEGATIVE
NITRITE UR-MCNC: NEGATIVE MG/ML
PH UR: 5 [PH] (ref 5–8)
PROT UR STRIP-MCNC: NEGATIVE MG/DL
PSA SERPL-MCNC: 0.99 NG/ML (ref 0–4)
RBC # UR STRIP: NEGATIVE /UL
SP GR UR: 1.02 (ref 1–1.03)
UROBILINOGEN UR QL: NORMAL

## 2017-03-31 PROCEDURE — 81003 URINALYSIS AUTO W/O SCOPE: CPT | Performed by: PHYSICIAN ASSISTANT

## 2017-03-31 PROCEDURE — 93000 ELECTROCARDIOGRAM COMPLETE: CPT | Performed by: PHYSICIAN ASSISTANT

## 2017-03-31 PROCEDURE — 86803 HEPATITIS C AB TEST: CPT | Performed by: PHYSICIAN ASSISTANT

## 2017-03-31 PROCEDURE — 99396 PREV VISIT EST AGE 40-64: CPT | Performed by: PHYSICIAN ASSISTANT

## 2017-03-31 PROCEDURE — 36415 COLL VENOUS BLD VENIPUNCTURE: CPT | Performed by: PHYSICIAN ASSISTANT

## 2017-03-31 PROCEDURE — 84153 ASSAY OF PSA TOTAL: CPT | Performed by: PHYSICIAN ASSISTANT

## 2017-03-31 RX ORDER — PRAVASTATIN SODIUM 40 MG
40 TABLET ORAL DAILY
Qty: 30 TABLET | Refills: 0 | Status: SHIPPED | OUTPATIENT
Start: 2017-03-31

## 2017-03-31 RX ORDER — DILTIAZEM HYDROCHLORIDE 240 MG/1
240 CAPSULE, EXTENDED RELEASE ORAL DAILY
Qty: 30 CAPSULE | Refills: 0 | Status: SHIPPED | OUTPATIENT
Start: 2017-03-31

## 2017-03-31 RX ORDER — SPIRONOLACTONE 25 MG/1
25 TABLET ORAL 2 TIMES DAILY
Qty: 60 TABLET | Refills: 0 | Status: SHIPPED | OUTPATIENT
Start: 2017-03-31

## 2017-03-31 RX ORDER — LISINOPRIL 40 MG/1
40 TABLET ORAL DAILY
Qty: 30 TABLET | Refills: 0 | Status: SHIPPED | OUTPATIENT
Start: 2017-03-31 | End: 2017-04-07 | Stop reason: SDUPTHER

## 2017-03-31 RX ORDER — PAROXETINE HYDROCHLORIDE 40 MG/1
40 TABLET, FILM COATED ORAL EVERY MORNING
Qty: 30 TABLET | Refills: 0 | Status: SHIPPED | OUTPATIENT
Start: 2017-03-31

## 2017-03-31 RX ORDER — METFORMIN HYDROCHLORIDE 500 MG/1
TABLET, EXTENDED RELEASE ORAL
Qty: 120 TABLET | Refills: 0 | Status: SHIPPED | OUTPATIENT
Start: 2017-03-31

## 2017-03-31 RX ORDER — GABAPENTIN 300 MG/1
300 CAPSULE ORAL EVERY 12 HOURS
Qty: 60 CAPSULE | Refills: 0 | Status: SHIPPED | OUTPATIENT
Start: 2017-03-31

## 2017-03-31 NOTE — PROGRESS NOTES
"Subjective   Flavio Slater is a 66 y.o. male    History of Present Illness    Patient presents today for a preventive medical visit.  Patient is here to determine screening labs and tests that are due and to determine immunization status as well.  Patient will be counseled regarding preventative medicine issues such as regular exercise and  healthy diet as well.  He is also here today for follow-up on hypertension, hyperlipidemia, BPH, diabetes mellitus, chronic low back pain and generalized anxiety disorder.  Unfortunately patient's recent urine drug screen was inappropriate for his medications prescribed.  Her drug screen reflected absence of benzodiazepines and absence of hydrocodone.  This has led to patient's discharge from our office.  He admits that he was \"weaning down on his medications\"  The following portions of the patient's history were reviewed and updated as appropriate: allergies, current medications, past social history and problem list    Review of Systems   Constitutional: Negative.  Negative for appetite change, diaphoresis, fatigue and unexpected weight change.   HENT: Negative.    Eyes: Negative.  Negative for visual disturbance.   Respiratory: Negative.  Negative for cough, chest tightness and shortness of breath.    Cardiovascular: Negative.  Negative for chest pain, palpitations and leg swelling.   Gastrointestinal: Negative.  Negative for abdominal pain, diarrhea, nausea and vomiting.   Endocrine: Positive for polyuria. Negative for polydipsia and polyphagia.   Genitourinary: Negative.    Musculoskeletal: Negative.    Skin: Negative.  Negative for color change, pallor and rash.   Allergic/Immunologic: Negative.    Neurological: Negative.  Negative for dizziness, tremors, seizures, syncope, speech difficulty, weakness, light-headedness, numbness and headaches.   Hematological: Negative.    Psychiatric/Behavioral: Negative.  Negative for agitation, behavioral problems, confusion, decreased " concentration, dysphoric mood, sleep disturbance and suicidal ideas. The patient is not nervous/anxious.    All other systems reviewed and are negative.      Objective     Vitals:    03/31/17 1026   BP: 136/80   Pulse: 81   Temp: 98.1 °F (36.7 °C)   SpO2: 98%       Physical Exam   Constitutional: He is oriented to person, place, and time. He appears well-developed and well-nourished.   HENT:   Head: Normocephalic and atraumatic.   Right Ear: External ear normal.   Left Ear: External ear normal.   Nose: Nose normal.   Mouth/Throat: Oropharynx is clear and moist.   Eyes: Conjunctivae and EOM are normal. Pupils are equal, round, and reactive to light.   Neck: Normal range of motion. Neck supple. No thyromegaly present.   Cardiovascular: Normal rate, regular rhythm, normal heart sounds and intact distal pulses.    No murmur heard.  Pulmonary/Chest: Effort normal and breath sounds normal.   Abdominal: Soft. Bowel sounds are normal. He exhibits no mass. There is no tenderness. A hernia (umbilical hernia, nontender, reducible) is present.   Genitourinary: Rectum normal.   Genitourinary Comments: Prostate 2+, firm, no nodules noted, nontender   Musculoskeletal: Normal range of motion. He exhibits no edema.   Neurological: He is alert and oriented to person, place, and time. He has normal reflexes. No cranial nerve deficit.   Skin: Skin is warm and dry.   Psychiatric: He has a normal mood and affect. His behavior is normal. Judgment and thought content normal.   Well-developed well-nourished white male no acute distress, pleasant demeanor.     ECG 12 Lead  Date/Time: 3/31/2017 11:23 AM  Performed by: MARIA LUZ PARIKH  Authorized by: MARIA LUZ PARIKH   Comparison: not compared with previous ECG   Previous ECG: no previous ECG available  Rhythm: sinus rhythm  Rate: normal  BPM: 77  Conduction: conduction normal  ST Segments: ST segments normal  T Waves: T waves normal  QRS axis: normal  Other: no other findings  Clinical  impression: normal ECG              Assessment/Plan     Diagnoses and all orders for this visit:    General medical exam    Frequent urination  -     POCT urinalysis dipstick, automated    Mixed hyperlipidemia  -     pravastatin (PRAVACHOL) 40 MG tablet; Take 1 tablet by mouth Daily.    Type 2 diabetes mellitus without complication, unspecified long term insulin use status  -     metFORMIN ER (GLUCOPHAGE-XR) 500 MG 24 hr tablet; 2 tabs po BID    Essential hypertension  -     lisinopril (PRINIVIL,ZESTRIL) 40 MG tablet; Take 1 tablet by mouth Daily.    Benign non-nodular prostatic hyperplasia, presence of lower urinary tract symptoms unspecified  -     PSA    Nocturia associated with benign prostatic hypertrophy  -     PSA    Need for hepatitis C screening test  -     Hepatitis C Antibody    Other orders  -     spironolactone (ALDACTONE) 25 MG tablet; Take 1 tablet by mouth 2 (Two) Times a Day.  -     PARoxetine (PAXIL) 40 MG tablet; Take 1 tablet by mouth Every Morning.  -     gabapentin (NEURONTIN) 300 MG capsule; Take 1 capsule by mouth Every 12 (Twelve) Hours.  -     diltiazem XR (DILACOR XR) 240 MG 24 hr capsule; Take 1 capsule by mouth Daily.     discuss treatment options for BPH and nocturia/polyuria, patient declines treatment at this time.  Will check PSA.  Information given to patient by our manager, Heidi regarding phone number patient can call to receive information about selecting a new primary care provider after his 30 days pass at which time patient will be dismissed as a patient from this practice.  Patient expresses understanding.  Encouraged improved diabetic diet adherence, increased exercise, patient states he will make these changes.  Recommended dilated diabetic eye exam, patient states he will schedule this himself at [facility].

## 2017-04-07 DIAGNOSIS — I10 ESSENTIAL HYPERTENSION: ICD-10-CM

## 2017-04-07 RX ORDER — LISINOPRIL 40 MG/1
40 TABLET ORAL DAILY
Qty: 90 TABLET | Refills: 1 | Status: SHIPPED | OUTPATIENT
Start: 2017-04-07

## 2024-02-21 ENCOUNTER — OFFICE VISIT (OUTPATIENT)
Dept: FAMILY MEDICINE CLINIC | Facility: CLINIC | Age: 74
End: 2024-02-21
Payer: MEDICARE

## 2024-02-21 VITALS
DIASTOLIC BLOOD PRESSURE: 82 MMHG | WEIGHT: 239 LBS | HEART RATE: 96 BPM | BODY MASS INDEX: 34.22 KG/M2 | OXYGEN SATURATION: 96 % | HEIGHT: 70 IN | SYSTOLIC BLOOD PRESSURE: 160 MMHG

## 2024-02-21 DIAGNOSIS — E11.65 TYPE 2 DIABETES MELLITUS WITH HYPERGLYCEMIA, WITHOUT LONG-TERM CURRENT USE OF INSULIN: Primary | ICD-10-CM

## 2024-02-21 DIAGNOSIS — I10 PRIMARY HYPERTENSION: ICD-10-CM

## 2024-02-21 DIAGNOSIS — Z85.46 HISTORY OF PROSTATE CANCER: ICD-10-CM

## 2024-02-21 DIAGNOSIS — E03.9 HYPOTHYROIDISM, UNSPECIFIED TYPE: ICD-10-CM

## 2024-02-21 PROBLEM — I82.409 DVT (DEEP VENOUS THROMBOSIS): Status: ACTIVE | Noted: 2023-03-11

## 2024-02-21 PROCEDURE — 1159F MED LIST DOCD IN RCRD: CPT | Performed by: INTERNAL MEDICINE

## 2024-02-21 PROCEDURE — 1160F RVW MEDS BY RX/DR IN RCRD: CPT | Performed by: INTERNAL MEDICINE

## 2024-02-21 PROCEDURE — 3077F SYST BP >= 140 MM HG: CPT | Performed by: INTERNAL MEDICINE

## 2024-02-21 PROCEDURE — 99203 OFFICE O/P NEW LOW 30 MIN: CPT | Performed by: INTERNAL MEDICINE

## 2024-02-21 PROCEDURE — 3079F DIAST BP 80-89 MM HG: CPT | Performed by: INTERNAL MEDICINE

## 2024-02-21 RX ORDER — PAROXETINE HYDROCHLORIDE 40 MG/1
60 TABLET, FILM COATED ORAL EVERY MORNING
COMMUNITY

## 2024-02-21 RX ORDER — TRAZODONE HYDROCHLORIDE 50 MG/1
50 TABLET ORAL NIGHTLY
COMMUNITY

## 2024-02-21 RX ORDER — GLIPIZIDE 10 MG/1
10 TABLET ORAL
COMMUNITY

## 2024-02-21 RX ORDER — BUPROPION HYDROCHLORIDE 300 MG/1
300 TABLET ORAL DAILY
COMMUNITY

## 2024-02-21 RX ORDER — DICLOFENAC SODIUM 75 MG/1
75 TABLET, DELAYED RELEASE ORAL 2 TIMES DAILY
COMMUNITY

## 2024-02-21 RX ORDER — TROSPIUM CHLORIDE 20 MG/1
20 TABLET, FILM COATED ORAL 2 TIMES DAILY
COMMUNITY

## 2024-02-21 RX ORDER — HYDROCHLOROTHIAZIDE 25 MG/1
25 TABLET ORAL DAILY
COMMUNITY

## 2024-02-21 RX ORDER — LEVOTHYROXINE SODIUM 0.05 MG/1
50 TABLET ORAL DAILY
COMMUNITY

## 2024-02-21 RX ORDER — ATORVASTATIN CALCIUM 80 MG/1
80 TABLET, FILM COATED ORAL DAILY
COMMUNITY

## 2024-02-21 NOTE — PROGRESS NOTES
Office Note     Name: Flavio Slater    : 1950     MRN: [id number]     Chief Complaint  Establish Care (Pt is here to establish care today. )    Subjective     History of Present Illness:  Flavio Slater is a 73 y.o. male who presents today for:    Diabetes: Patient is currently taking all medications as directed.  Patient is currently checking glucoses and they are as follows 160 this AM but occasionally up to 200.  Patient denies any persistent nonhealing skin wounds or ulcers is often slow to heal thought.  Patient denies any changes in vision.     Hypertension: Patient is here to followup on hypertension and is  taking all medications. Patient has not  experienced signicant side effects.   Has history of abnormal heart beat, no history of heart attacks or strokes , [facility], [facility]      Urine is checked very 6 months.    PSA is still done every 6 months, is on androgen deprivation treatment and has severe hot flashes    Hypothyroidism, has had labs within the last few months.     Sees Dr Camacho At [facility]. Just needs a private PCP closer to home for other needs    Had DVT following prostatectomy and radiation for prostate cancer and has  had chronic left leg pain and weakness since then.  Finished eliquis and remain on aspirin        [facility]     Retired from  and guard, worked at [employer]      Review of Systems:   Review of Systems    Past Medical History:   Past Medical History:   Diagnosis Date    Anxiety     ARF (acute renal failure)     Back pain     Depression     Diabetes mellitus type 2, uncontrolled     Diabetes mellitus, new onset     Hyperlipidemia     Hypertension     Low back pain        Past Surgical History:   Past Surgical History:   Procedure Laterality Date    PROSTATECTOMY          TONSILLECTOMY      TONSILLECTOMY         Family History:   Family History   Problem Relation Age of  Onset    Hypertension Mother     Diabetes Paternal Grandmother     Diabetes Paternal Grandfather     Depression Other        Social History:   Social History     Socioeconomic History    Marital status:    Tobacco Use    Smoking status: Former     Packs/day: 1.00     Years: 30.00     Additional pack years: 0.00     Total pack years: 30.00     Types: Cigarettes     Start date: 1972     Quit date: 2002     Years since quittin.0    Smokeless tobacco: Never   Vaping Use    Vaping Use: Never used   Substance and Sexual Activity    Alcohol use: Not Currently     Comment: 2015    Drug use: No    Sexual activity: Never     Comment:        Immunizations:   Immunization History   Administered Date(s) Administered    COVID-19 (AGNES) 2021    Pneumococcal, Unspecified 10/12/2015    Tdap 2016        Medications:     Current Outpatient Medications:     aspirin 81 MG EC tablet, Take 1 tablet by mouth., Disp: , Rfl:     atorvastatin (LIPITOR) 80 MG tablet, Take 1 tablet by mouth Daily., Disp: , Rfl:     buPROPion XL (WELLBUTRIN XL) 300 MG 24 hr tablet, Take 1 tablet by mouth Daily., Disp: , Rfl:     diclofenac (VOLTAREN) 75 MG EC tablet, Take 1 tablet by mouth 2 (Two) Times a Day., Disp: , Rfl:     diltiazem XR (DILACOR XR) 240 MG 24 hr capsule, Take 1 capsule by mouth Daily., Disp: 30 capsule, Rfl: 0    empagliflozin (JARDIANCE) 25 MG tablet tablet, Take 1 tablet by mouth Daily., Disp: , Rfl:     FREESTYLE LITE test strip, USE TWICE A DAY, Disp: 300 each, Rfl: 2    gabapentin (NEURONTIN) 300 MG capsule, Take 1 capsule by mouth Every 12 (Twelve) Hours. (Patient taking differently: Take 1 capsule by mouth 3 (Three) Times a Day. Take 3 at bed time), Disp: 60 capsule, Rfl: 0    glipizide (GLUCOTROL) 10 MG tablet, Take 1 tablet by mouth 2 (Two) Times a Day Before Meals., Disp: , Rfl:     Glucose Blood (FREESTYLE LITE TEST VI), by In Vitro route. 1 strip two times daily, Disp: , Rfl:      "hydroCHLOROthiazide 25 MG tablet, Take 1 tablet by mouth Daily., Disp: , Rfl:     Lancets (FREESTYLE) lancets, Test blood glucose twice daily., Disp: 300 each, Rfl: 1    levothyroxine (SYNTHROID, LEVOTHROID) 50 MCG tablet, Take 1 tablet by mouth Daily., Disp: , Rfl:     lisinopril (PRINIVIL,ZESTRIL) 40 MG tablet, Take 1 tablet by mouth Daily., Disp: 90 tablet, Rfl: 1    metFORMIN (GLUCOPHAGE) 1000 MG tablet, Take 1 tablet by mouth 2 (Two) Times a Day With Meals., Disp: , Rfl:     traZODone (DESYREL) 50 MG tablet, Take 1 tablet by mouth Every Night., Disp: , Rfl:     trospium (SANCTURA) 20 MG tablet, Take 1 tablet by mouth 2 (Two) Times a Day., Disp: , Rfl:     PARoxetine (PAXIL) 40 MG tablet, Take 1.5 tablets by mouth Every Morning., Disp: , Rfl:     Allergies:   Allergies   Allergen Reactions    Phenobarbital Sodium      Hypnotics/sedatives/sleep disorder agents       Objective     Vital Signs  /82   Pulse 96   Ht 177.8 cm (70\")   Wt 108 kg (239 lb)   SpO2 96%   BMI 34.29 kg/m²   Estimated body mass index is 34.29 kg/m² as calculated from the following:    Height as of this encounter: 177.8 cm (70\").    Weight as of this encounter: 108 kg (239 lb).          Physical Exam  Vitals and nursing note reviewed.   Constitutional:       Appearance: Normal appearance.   Cardiovascular:      Rate and Rhythm: Normal rate and regular rhythm.      Heart sounds: No murmur heard.     No friction rub. No gallop.   Pulmonary:      Effort: Pulmonary effort is normal.      Breath sounds: Normal breath sounds. No wheezing, rhonchi or rales.   Neurological:      Mental Status: He is alert.            Procedures     Assessment and Plan     1. Type 2 diabetes mellitus with hyperglycemia, without long-term current use of insulin  Will continue current regimen of glipizide, jardiance, metformin  Urine micro, diabetic eye exam, and A1C were all [facility]    2. History of prostate cancer  Will " guido [facility]    3. Primary hypertension  Will continue current refimen of lisinopril, HCTZ, diltiazem       4. Hypothyroidism, unspecified type  Continue levothyroxine 50 MNCG      Follow Up  No follow-ups on file.    Patient was advised to call the office or seek medical care if  any issues discussed during this visit worsen or persist or if new concerns arise        MD ASTRID [facility]  [address] [place] [id number] [phone]

## 2024-02-26 PROBLEM — E03.9 HYPOTHYROIDISM: Status: ACTIVE | Noted: 2024-02-26

## 2024-02-26 PROBLEM — E11.65 TYPE 2 DIABETES MELLITUS WITH HYPERGLYCEMIA, WITHOUT LONG-TERM CURRENT USE OF INSULIN: Status: ACTIVE | Noted: 2024-02-26

## 2025-02-20 ENCOUNTER — OFFICE VISIT (OUTPATIENT)
Dept: FAMILY MEDICINE CLINIC | Facility: CLINIC | Age: 75
End: 2025-02-20
Payer: MEDICARE

## 2025-02-20 VITALS
BODY MASS INDEX: 31.91 KG/M2 | SYSTOLIC BLOOD PRESSURE: 162 MMHG | OXYGEN SATURATION: 98 % | DIASTOLIC BLOOD PRESSURE: 78 MMHG | HEART RATE: 76 BPM | WEIGHT: 222.9 LBS | HEIGHT: 70 IN

## 2025-02-20 DIAGNOSIS — E11.43 TYPE 2 DIABETES MELLITUS WITH AUTONOMIC NEUROPATHY, UNSPECIFIED WHETHER LONG TERM INSULIN USE: Primary | ICD-10-CM

## 2025-02-20 PROCEDURE — G0439 PPPS, SUBSEQ VISIT: HCPCS | Performed by: INTERNAL MEDICINE

## 2025-02-20 PROCEDURE — 3077F SYST BP >= 140 MM HG: CPT | Performed by: INTERNAL MEDICINE

## 2025-02-20 PROCEDURE — 1170F FXNL STATUS ASSESSED: CPT | Performed by: INTERNAL MEDICINE

## 2025-02-20 PROCEDURE — 3078F DIAST BP <80 MM HG: CPT | Performed by: INTERNAL MEDICINE

## 2025-02-20 PROCEDURE — 96160 PT-FOCUSED HLTH RISK ASSMT: CPT | Performed by: INTERNAL MEDICINE

## 2025-02-20 PROCEDURE — 1125F AMNT PAIN NOTED PAIN PRSNT: CPT | Performed by: INTERNAL MEDICINE

## 2025-02-20 NOTE — PROGRESS NOTES
Subjective   The ABCs of the Annual Wellness Visit  Medicare Wellness Visit      Flavio Slater is a 74 y.o. patient who presents for a Medicare Wellness Visit.    The following portions of the patient's history were reviewed and   updated as appropriate: allergies, current medications, past family history, past medical history, past social history, past surgical history, and problem list.    Compared to one year ago, the patient's physical   health is better.  Compared to one year ago, the patient's mental   health is the same.    Recent Hospitalizations:  He was admitted within the past 365 days at [facility]. Dehyrdration- Had not been drinking enough so they  they went down on his ozempic dose. They stopped his jardiance and Glipizide    Current Medical Providers:  Patient Care Team:  Angeli Blanco MD as PCP - General (Internal Medicine & Pediatrics)    Outpatient Medications Prior to Visit   Medication Sig Dispense Refill    aspirin 81 MG EC tablet Take 1 tablet by mouth.      atorvastatin (LIPITOR) 80 MG tablet Take 1 tablet by mouth Daily.      buPROPion XL (WELLBUTRIN XL) 300 MG 24 hr tablet Take 1 tablet by mouth Daily.      diltiazem XR (DILACOR XR) 240 MG 24 hr capsule Take 1 capsule by mouth Daily. 30 capsule 0    FREESTYLE LITE test strip USE TWICE A  each 2    gabapentin (NEURONTIN) 300 MG capsule Take 1 capsule by mouth Every 12 (Twelve) Hours. (Patient taking differently: Take 1 capsule by mouth 3 (Three) Times a Day. Take 3 at bed time) 60 capsule 0    Glucose Blood (FREESTYLE LITE TEST VI) by In Vitro route. 1 strip two times daily      hydroCHLOROthiazide 25 MG tablet Take 1 tablet by mouth Daily.      Lancets (FREESTYLE) lancets Test blood glucose twice daily. 300 each 1    levothyroxine (SYNTHROID, LEVOTHROID) 50 MCG tablet Take 1 tablet by mouth Daily.      lisinopril (PRINIVIL,ZESTRIL) 40 MG tablet Take 1 tablet by mouth Daily. 90 tablet 1    metFORMIN (GLUCOPHAGE) 1000  MG tablet Take 1 tablet by mouth 2 (Two) Times a Day With Meals.      PARoxetine (PAXIL) 40 MG tablet Take 1.5 tablets by mouth Every Morning.      Semaglutide,0.25 or 0.5MG/DOS, (OZEMPIC) 2 MG/1.5ML solution pen-injector Inject 0.5 mg under the skin into the appropriate area as directed 1 (One) Time Per Week.      traZODone (DESYREL) 50 MG tablet Take 1 tablet by mouth Every Night.      trospium (SANCTURA) 20 MG tablet Take 1 tablet by mouth 2 (Two) Times a Day.      diclofenac (VOLTAREN) 75 MG EC tablet Take 1 tablet by mouth 2 (Two) Times a Day. (Patient not taking: Reported on 2/20/2025)      empagliflozin (JARDIANCE) 25 MG tablet tablet Take 1 tablet by mouth Daily. (Patient not taking: Reported on 2/20/2025)      glipizide (GLUCOTROL) 10 MG tablet Take 1 tablet by mouth 2 (Two) Times a Day Before Meals. (Patient not taking: Reported on 2/20/2025)       No facility-administered medications prior to visit.     No opioid medication identified on active medication list. I have reviewed chart for other potential  high risk medication/s and harmful drug interactions in the elderly.      Aspirin is on active medication list. Aspirin use is indicated based on review of current medical condition/s. Pros and cons of this therapy have been discussed today. Benefits of this medication outweigh potential harm.  Patient has been encouraged to continue taking this medication.  .      Patient Active Problem List   Diagnosis    Low back pain    Diabetes mellitus type 2, uncontrolled    Depression    Anxiety    Primary hypertension    Acute renal failure    Hyperlipidemia    DVT (deep venous thrombosis)    History of prostate cancer    Type 2 diabetes mellitus with hyperglycemia, without long-term current use of insulin    Hypothyroidism     Advance Care Planning Advance Directive is not on file.  ACP discussion was held with the patient during this visit. Patient has an advance directive (not in EMR), copy requested.   "          Objective   Vitals:    25 0823   BP: 162/78   Pulse: 76   SpO2: 98%   Weight: 101 kg (222 lb 14.4 oz)   Height: 177.8 cm (70\")   PainSc: 6    PainLoc: Generalized       Estimated body mass index is 31.98 kg/m² as calculated from the following:    Height as of this encounter: 177.8 cm (70\").    Weight as of this encounter: 101 kg (222 lb 14.4 oz).    BMI is >= 30 and <35. (Class 1 Obesity). The following options were offered after discussion;: weight loss educational material (shared in after visit summary)         Gait and Balance Evaluation:  Shuffle   Does the patient have evidence of cognitive impairment? No                                                                                                Health  Risk Assessment    Smoking Status:  Social History     Tobacco Use   Smoking Status Former    Current packs/day: 0.00    Average packs/day: 1 pack/day for 30.1 years (30.1 ttl pk-yrs)    Types: Cigarettes    Start date: 1972    Quit date: 2002    Years since quittin.0   Smokeless Tobacco Never     Alcohol Consumption:  Social History     Substance and Sexual Activity   Alcohol Use Not Currently    Comment: 2015       Fall Risk Screen  PARESHADI Fall Risk Assessment was completed, and patient is at HIGH risk for falls. Assessment completed on:2025    Depression Screening   Little interest or pleasure in doing things? Several days   Feeling down, depressed, or hopeless? Almost all   PHQ-2 Total Score 4   Trouble falling or staying asleep, or sleeping too much? Almost all   Feeling tired or having little energy? Almost all   Poor appetite or overeating? Not at all   Feeling bad about yourself - or that you are a failure or have let yourself or your family down? Several days   Trouble concentrating on things, such as reading the newspaper or watching television? Over half   Moving or speaking so slowly that other people could have noticed? Or the opposite - being so fidgety " or restless that you have been moving around a lot more than usual? Over half   Thoughts that you would be better off dead, or of hurting yourself in some way? Not at all   PHQ-9 Total Score 15   If you checked off any problems, how difficult have these problems made it for you to do your work, take care of things at home, or get along with other people? Somewhat difficult      Health Habits and Functional and Cognitive Screenin/20/2025     8:27 AM   Functional & Cognitive Status   Do you have difficulty preparing food and eating? No   Do you have difficulty bathing yourself, getting dressed or grooming yourself? No   Do you have difficulty using the toilet? No   Do you have difficulty moving around from place to place? No   Do you have trouble with steps or getting out of a bed or a chair? Yes   Current Diet Other   Dental Exam Up to date   Eye Exam Other        Eye Exam Comment scheduled   Exercise (times per week) 0 times per week   Current Exercises Include No Regular Exercise   Do you need help using the phone?  No   Are you deaf or do you have serious difficulty hearing?  Yes   Do you need help to go to places out of walking distance? Yes   Do you need help shopping? Yes   Do you need help preparing meals?  No   Do you need help with housework?  No   Do you need help with laundry? No   Do you need help taking your medications? No   Do you need help managing money? No   Do you ever drive or ride in a car without wearing a seat belt? No   Have you felt unusual stress, anger or loneliness in the last month? No   Who do you live with? Alone   If you need help, do you have trouble finding someone available to you? No   Have you been bothered in the last four weeks by sexual problems? No   Do you have difficulty concentrating, remembering or making decisions? Yes           Age-appropriate Screening Schedule:  Refer to the list below for future screening recommendations based on patient's age, sex and/or  medical conditions. Orders for these recommended tests are listed in the plan section. The patient has been provided with a written plan.    Health Maintenance List  Health Maintenance   Topic Date Due    URINE MICROALBUMIN-CREATININE RATIO (uACR)  Never done    Pneumococcal Vaccine 50+ (1 of 2 - PCV) 11/13/1969    ANNUAL WELLNESS VISIT  Never done    HEMOGLOBIN A1C  09/06/2017    AAA SCREEN ONCE  Never done    LIPID PANEL  03/06/2018    DIABETIC FOOT EXAM  03/31/2018    COLORECTAL CANCER SCREENING  03/31/2025    ZOSTER VACCINE (2 of 2) 02/20/2025 (Originally 5/26/2015)    DIABETIC EYE EXAM  04/10/2025 (Originally 3/31/2018)    COVID-19 Vaccine (2 - 2024-25 season) 02/01/2026 (Originally 9/1/2024)    BMI FOLLOWUP  02/20/2026    TDAP/TD VACCINES (2 - Td or Tdap) 05/26/2026    HEPATITIS C SCREENING  Completed    INFLUENZA VACCINE  Completed                                                                                                                                                CMS Preventative Services Quick Reference  Risk Factors Identified During Encounter  None Identified    The above risks/problems have been discussed with the patient.  Pertinent information has been shared with the patient in the After Visit Summary.  An After Visit Summary and PPPS were made available to the patient.    Follow Up:   Next Medicare Wellness visit to be scheduled in 1 year.         Additional E&M Note during same encounter follows:  Patient has additional, significant, and separately identifiable condition(s)/problem(s) that require work above and beyond the Medicare Wellness Visit     Chief Complaint  Medicare Wellness-subsequent (Pt is here for a wellness exam today. )    Subjective    HPI        Glucose 154 this morning     The patient is a 74-year-old male who presents today for a subsequent Medicare wellness visit. He also has a history of type 2 diabetes, hypertension, hyperlipidemia, DVT, anxiety, and depression. He  follows with the [facility] for most of his medical needs.    He reports that [facility] [place] due to severe dehydration, which he attributes to his medication, Ozempic. He experienced dry mouth for a duration of 10 minutes without water intake. His Ozempic dosage was reduced from 1 mg to 0.5 mg, which he believes has improved his condition. Prior to hospitalization, he experienced vomiting and diarrhea, which he suspects contributed to his dehydration. He is no longer on Jardiance and glipizide, as these medications were discontinued during his hospital stay. He has been following up with his primary care physician since his discharge. He reports a slight improvement in his health compared to the previous year. He is currently on a daily regimen of aspirin and no other blood thinners. [facility]. His blood glucose level this morning was 154. He has experienced 5 falls this year but reports no fractures. He does not regularly use a walker or cane but has them available at home and in his car. He has noticed some memory issues over the past year, particularly with short-term recall. He drives independently and occasionally misplaces his car keys but does not get lost while driving. He has not had any surgeries this year. He has an upcoming appointment with an ophthalmologist next month. He has not received the RSV vaccine and does not plan to receive any more COVID-19 vaccines. He has received the Francis [employer] Francis COVID-19 vaccine.    Supplemental Information  He has experienced some memory issues over the past year, particularly with short-term recall. He drives independently and occasionally misplaces his car keys but does not get lost while driving. He has not had any surgeries this year. He has an upcoming appointment with an ophthalmologist  "next month. He has not received the RSV vaccine and does not plan to receive any more COVID-19 vaccines. He has received the Francis [employer] Francis COVID-19 vaccine.    ALLERGIES  The patient is allergic to PHENOBARBITAL.    MEDICATIONS  Current: Ozempic, aspirin  Discontinued: Jardiance, glipizide    IMMUNIZATIONS  The patient has received the [employer] COVID-19 vaccine.          Objective   Vital Signs:  /78   Pulse 76   Ht 177.8 cm (70\")   Wt 101 kg (222 lb 14.4 oz)   SpO2 98%   BMI 31.98 kg/m²   Physical Exam      Lungs were auscultated.            Results  Laboratory Studies  Blood sugar was 154.              Assessment and Plan        1. Medicare wellness visit.  He has experienced 5 falls this year but reports no fractures. He does not regularly use a walker or cane but has them available at home and in his car. He is advised to keep a cane or walker handy, especially when traveling long distances, to prevent falls.  [facility] and his most recent labs were less than 6 months ago.  He thinks his colonoscopy is up-to-date.  He declines COVID vaccines today.  He signed a medical records release of the leg get records from his recent hospitalization well as his colonoscopy.  A urine test will be conducted today to monitor kidney function due to his diabetes. He is advised to ensure he is up-to-date with all necessary vaccinations. A [facility] of [facility] living [facility]. He is instructed to provide a copy of his colonoscopy report. He is also advised to bring a form to his upcoming ophthalmology appointment for the doctor to complete and fax back.    2. Type 2 Diabetes Mellitus.  He is currently on Ozempic 0.5 mg. He reports that his blood sugar was 154 this morning. He is advised to continue with the current dosage of Ozempic. A urine test will be conducted today to monitor kidney function.    3. Hypertension.  He is advised to continue his current " medication regimen.    4. Hyperlipidemia.  He is advised to continue his current medication regimen.    5. Deep Vein Thrombosis (DVT).  He reports chronic swelling in one leg, likely due to a previous blood clot. He is currently taking aspirin daily. He is advised to continue with the aspirin regimen.    6. Anxiety and Depression.  He reports that his mental and emotional health is about the same as last year. No new symptoms of suicidal or homicidal ideation were noted. He is advised to continue with his current management plan.    PROCEDURE  The patient underwent a prostatectomy in 2022.            Follow Up   No follow-ups on file.  Patient was given instructions and counseling regarding his condition or for health maintenance advice. Please see specific information pulled into the AVS if appropriate.  Patient or patient representative verbalized consent for the use of Ambient Listening during the visit with  Angeli Blanco MD for chart documentation. 2/20/2025  12:38 EST

## 2025-02-20 NOTE — LETTER
February 20, 2025    Flavio Slater  [address]  [address]  [place] [address]          .Unless you have had a previous reaction, I recommend and encourage that you request to receive the following vaccines at your pharmacy:    RSV (Arexvy or Abrysvo- respiratory syncytial virusvaccine)  Shingrix (Shingles vaccine)  Tdap (Tetanus  and whooping cough booster)  COVID-19 vaccines (you may need the newest bi-valent booster)   PCV20 (Pneumonia Vaccine)  Influenza (Flu  Vaccine) starting in September or October.    They can administer this at your convenience without a prescription.    If you have already received these from your pharmacy or another physician, or if you get them in the future please be sure to provide us a copy of your immunization record so that we can update your records.    You can have your pharmacy fax them to the number above or find the exact date of your vaccines and bring them to your next appointment.                       Angeli Blanco MD

## 2025-02-20 NOTE — LETTER
February 20, 2025    Flavio Slater  [address]  [place] [address]      It is extremely important that patients with diabetes have regular eye exams even if the diabetes is well controlled.   Please make sure your eye doctor knows that you have diabetes.    We do not have record of a recent eye exam. Please contact your eye doctor and have them fax your most recent diabetic eye exam to our office at [phone].    If you need a new referral to an eye doctor please let me know.      Sincerely,      MD Angeli Mar MD   n/a

## 2025-02-21 LAB
ALBUMIN/CREAT UR: 16 MG/G CREAT (ref 0–29)
CREAT UR-MCNC: 50.4 MG/DL
MICROALBUMIN UR-MCNC: 8.3 UG/ML